# Patient Record
Sex: FEMALE | Race: WHITE | NOT HISPANIC OR LATINO | Employment: OTHER | URBAN - METROPOLITAN AREA
[De-identification: names, ages, dates, MRNs, and addresses within clinical notes are randomized per-mention and may not be internally consistent; named-entity substitution may affect disease eponyms.]

---

## 2023-11-04 PROBLEM — F33.3 MAJOR DEPRESSIVE DISORDER, RECURRENT EPISODE, SEVERE, WITH PSYCHOSIS (MULTI): Status: ACTIVE | Noted: 2023-11-04

## 2023-11-04 PROBLEM — M19.90 ARTHRITIS: Status: ACTIVE | Noted: 2023-11-04

## 2023-11-04 PROBLEM — F71 MODERATE INTELLECTUAL DISABILITY: Status: ACTIVE | Noted: 2023-11-04

## 2023-11-04 PROBLEM — L30.9 DERMATITIS: Status: ACTIVE | Noted: 2023-11-04

## 2023-11-04 PROBLEM — G40.909 SEIZURE DISORDER (MULTI): Status: ACTIVE | Noted: 2023-11-04

## 2023-11-04 PROBLEM — K21.9 GERD (GASTROESOPHAGEAL REFLUX DISEASE): Status: ACTIVE | Noted: 2023-11-04

## 2023-11-04 PROBLEM — F31.5 BIPOLAR AFFECTIVE DISORDER, DEPRESSED, SEVERE, WITH PSYCHOTIC BEHAVIOR (MULTI): Status: ACTIVE | Noted: 2023-11-04

## 2023-11-04 PROBLEM — F44.9 CONVERSION DISORDER: Status: ACTIVE | Noted: 2023-11-04

## 2023-11-04 PROBLEM — G80.9 CEREBRAL PALSY (MULTI): Status: ACTIVE | Noted: 2023-11-04

## 2023-11-04 PROBLEM — G47.9 SLEEP DISTURBANCES: Status: ACTIVE | Noted: 2023-11-04

## 2023-11-04 PROBLEM — L23.9 ALLERGIC DERMATITIS: Status: ACTIVE | Noted: 2023-11-04

## 2023-11-04 PROBLEM — E55.9 VITAMIN D DEFICIENCY: Status: ACTIVE | Noted: 2023-11-04

## 2023-11-04 RX ORDER — NYSTATIN 100000 [USP'U]/G
POWDER TOPICAL
COMMUNITY

## 2023-11-04 RX ORDER — PANTOPRAZOLE SODIUM 40 MG/1
TABLET, DELAYED RELEASE ORAL
COMMUNITY
Start: 2018-01-22

## 2023-11-04 RX ORDER — NYSTATIN 100000 U/G
CREAM TOPICAL
COMMUNITY

## 2023-11-04 RX ORDER — ACETAMINOPHEN 325 MG/1
TABLET ORAL
COMMUNITY

## 2023-11-04 RX ORDER — LEVETIRACETAM 500 MG/1
TABLET ORAL
COMMUNITY

## 2023-11-04 RX ORDER — SERTRALINE HYDROCHLORIDE 50 MG/1
50 TABLET, FILM COATED ORAL DAILY
COMMUNITY
Start: 2018-01-11

## 2023-11-04 RX ORDER — LURASIDONE HYDROCHLORIDE 40 MG/1
40 TABLET, FILM COATED ORAL EVERY EVENING
COMMUNITY
Start: 2021-05-13

## 2023-11-04 RX ORDER — LACOSAMIDE 100 MG/1
TABLET ORAL
COMMUNITY

## 2023-11-04 RX ORDER — SERTRALINE HYDROCHLORIDE 100 MG/1
100 TABLET, FILM COATED ORAL DAILY
COMMUNITY
Start: 2017-08-28

## 2023-11-04 RX ORDER — CHOLECALCIFEROL (VITAMIN D3) 50 MCG
TABLET ORAL
COMMUNITY

## 2023-11-04 RX ORDER — CLONAZEPAM 0.5 MG/1
TABLET ORAL
COMMUNITY

## 2023-11-04 RX ORDER — ZONISAMIDE 100 MG/1
CAPSULE ORAL
COMMUNITY

## 2023-11-06 ENCOUNTER — TELEMEDICINE (OUTPATIENT)
Dept: BEHAVIORAL HEALTH | Facility: CLINIC | Age: 63
End: 2023-11-06
Payer: MEDICARE

## 2023-11-06 DIAGNOSIS — F31.5 BIPOLAR AFFECTIVE DISORDER, DEPRESSED, SEVERE, WITH PSYCHOTIC BEHAVIOR (MULTI): ICD-10-CM

## 2023-11-06 PROCEDURE — 99212 OFFICE O/P EST SF 10 MIN: CPT | Performed by: NURSE PRACTITIONER
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
